# Patient Record
Sex: MALE | Race: WHITE | HISPANIC OR LATINO | URBAN - METROPOLITAN AREA
[De-identification: names, ages, dates, MRNs, and addresses within clinical notes are randomized per-mention and may not be internally consistent; named-entity substitution may affect disease eponyms.]

---

## 2017-10-06 ENCOUNTER — EMERGENCY (EMERGENCY)
Facility: HOSPITAL | Age: 42
LOS: 1 days | Discharge: HOME | End: 2017-10-06
Attending: EMERGENCY MEDICINE | Admitting: EMERGENCY MEDICINE
Payer: MEDICAID

## 2017-10-06 VITALS
HEART RATE: 62 BPM | DIASTOLIC BLOOD PRESSURE: 80 MMHG | OXYGEN SATURATION: 96 % | RESPIRATION RATE: 19 BRPM | TEMPERATURE: 99 F | SYSTOLIC BLOOD PRESSURE: 123 MMHG

## 2017-10-06 PROCEDURE — 12002 RPR S/N/AX/GEN/TRNK2.6-7.5CM: CPT

## 2017-10-06 PROCEDURE — 99282 EMERGENCY DEPT VISIT SF MDM: CPT | Mod: 25

## 2017-10-06 PROCEDURE — 99283 EMERGENCY DEPT VISIT LOW MDM: CPT | Mod: 25

## 2017-10-06 NOTE — ED PROVIDER NOTE - CARE PLAN
Principal Discharge DX:	Laceration of right middle finger without foreign body without damage to nail, initial encounter

## 2017-10-06 NOTE — ED PROVIDER NOTE - MEDICAL DECISION MAKING DETAILS
Campos: as glass did not break before cutting him no need for xray. Will explore wound for tendon injury and FB. needs tetanus. Will close wound and splint.

## 2017-10-06 NOTE — ED PROVIDER NOTE - OBJECTIVE STATEMENT
Patient with 4.5cm laceration on dorsal aspect of 3rd digit on Rt.  Patient was moving a mirror and it slipped. It cut the back of his finger as it fell. It shattered on the ground but after in had cut his finger. No foreign body potential from the story. Nl ROM, nl sensation distally. Not on blood thinners no allergies and tetanus needed.

## 2017-10-06 NOTE — ED ADULT NURSE NOTE - OBJECTIVE STATEMENT
41 yo male A&[id number] presents to the ED with the c/o r 3rd digit lac s/p a mirror falling on his hand. Pt denies shattering of the mirror, states that the mirror cut his finger.  Pt able to move hand, + sensation to touch, pt states that he is not in pain. MAEX4
